# Patient Record
Sex: MALE | Race: WHITE | NOT HISPANIC OR LATINO | Employment: OTHER | URBAN - NONMETROPOLITAN AREA
[De-identification: names, ages, dates, MRNs, and addresses within clinical notes are randomized per-mention and may not be internally consistent; named-entity substitution may affect disease eponyms.]

---

## 2025-07-26 ENCOUNTER — OFFICE VISIT (OUTPATIENT)
Dept: URGENT CARE | Age: 45
End: 2025-07-26
Payer: MEDICARE

## 2025-07-26 ENCOUNTER — APPOINTMENT (OUTPATIENT)
Dept: URGENT CARE | Age: 45
End: 2025-07-26
Payer: MEDICARE

## 2025-07-26 ENCOUNTER — ANCILLARY PROCEDURE (OUTPATIENT)
Dept: URGENT CARE | Age: 45
End: 2025-07-26
Payer: MEDICARE

## 2025-07-26 VITALS
DIASTOLIC BLOOD PRESSURE: 72 MMHG | RESPIRATION RATE: 17 BRPM | OXYGEN SATURATION: 97 % | WEIGHT: 172 LBS | HEART RATE: 94 BPM | SYSTOLIC BLOOD PRESSURE: 118 MMHG | TEMPERATURE: 98.5 F

## 2025-07-26 DIAGNOSIS — R52 PAIN: ICD-10-CM

## 2025-07-26 DIAGNOSIS — S60.211A CONTUSION OF RIGHT WRIST, INITIAL ENCOUNTER: Primary | ICD-10-CM

## 2025-07-26 DIAGNOSIS — S63.501A SPRAIN OF RIGHT WRIST, INITIAL ENCOUNTER: ICD-10-CM

## 2025-07-26 PROCEDURE — 99203 OFFICE O/P NEW LOW 30 MIN: CPT

## 2025-07-26 PROCEDURE — 73110 X-RAY EXAM OF WRIST: CPT | Mod: RIGHT SIDE

## 2025-07-26 RX ORDER — OMEPRAZOLE 40 MG/1
40 CAPSULE, DELAYED RELEASE ORAL
COMMUNITY
Start: 2025-05-05

## 2025-07-26 RX ORDER — FLUOXETINE 20 MG/1
60 CAPSULE ORAL DAILY
COMMUNITY

## 2025-07-26 RX ORDER — LISINOPRIL AND HYDROCHLOROTHIAZIDE 12.5; 2 MG/1; MG/1
1 TABLET ORAL
COMMUNITY
Start: 2025-07-03

## 2025-07-26 RX ORDER — METFORMIN HYDROCHLORIDE 1000 MG/1
1000 TABLET ORAL
COMMUNITY
Start: 2025-07-03

## 2025-07-26 RX ORDER — ROSUVASTATIN CALCIUM 10 MG/1
10 TABLET, COATED ORAL NIGHTLY
COMMUNITY
Start: 2025-06-03

## 2025-07-26 RX ORDER — GABAPENTIN 300 MG/1
600 CAPSULE ORAL 3 TIMES DAILY
COMMUNITY
Start: 2025-06-13

## 2025-07-26 RX ORDER — BUPRENORPHINE AND NALOXONE 4; 1 MG/1; MG/1
FILM, SOLUBLE BUCCAL; SUBLINGUAL
COMMUNITY

## 2025-07-26 RX ORDER — DICYCLOMINE HYDROCHLORIDE 10 MG/1
CAPSULE ORAL
COMMUNITY
Start: 2025-07-08

## 2025-07-26 RX ORDER — BLOOD SUGAR DIAGNOSTIC
STRIP MISCELLANEOUS
COMMUNITY
Start: 2025-06-13

## 2025-07-26 RX ORDER — BUDESONIDE AND FORMOTEROL FUMARATE DIHYDRATE 80; 4.5 UG/1; UG/1
AEROSOL RESPIRATORY (INHALATION) EVERY 12 HOURS
COMMUNITY
Start: 2013-12-05

## 2025-07-26 RX ORDER — EMPAGLIFLOZIN 25 MG/1
25 TABLET, FILM COATED ORAL
COMMUNITY

## 2025-07-26 RX ORDER — CELECOXIB 200 MG/1
200 CAPSULE ORAL 2 TIMES DAILY
COMMUNITY
Start: 2025-07-17

## 2025-07-26 ASSESSMENT — ENCOUNTER SYMPTOMS
CHILLS: 0
WEAKNESS: 0
WOUND: 0
FATIGUE: 0
FEVER: 0
COUGH: 0
ARTHRALGIAS: 1
DIZZINESS: 0
BACK PAIN: 0
NUMBNESS: 0
JOINT SWELLING: 0
SHORTNESS OF BREATH: 0

## 2025-07-26 NOTE — PATIENT INSTRUCTIONS
Discharge instructions    Please follow up with orthopedics within the next 7 days.    If you develop any numbness weakness severe pain cold fingers absent pulses immediately go to the emergency room for evaluation.    Would recommend range of motion exercises rest ice compression elevation.    It is important to take prescriptions as prescribed and complete all antibiotics.     If your symptoms worsen you are instructed to immediately go to the emergency room for reevaluation and further assessment.    If you develop any chest pain, SOB, or difficulty breathing you are instructed to go to the emergency room for reevaluation.    All discharge instructions will be provided and explained to the patient at discharge.    If you have any questions regarding your treatment plan please call the [facility] urgent care clinic.

## 2025-07-26 NOTE — PROGRESS NOTES
Subjective   Patient ID: Alex Burnette is a 45 y.o. male. They present today with a chief complaint of Wrist Injury (1 hour ago, right wrist, was using impact  to change tire, impact  rotated taking his hand with it, says he heard a crunch isnt sure if it was his wrist or if it was the tire.).    History of Present Illness  Patient is a 45-year-old male presenting to the clinic with complaints of injury to right wrist.  Patient states he was using an impact drill trying to get wheels off of his mother's car.  He states that the impact drill spun to the left and spun his wrist with it.  Patient states he did hit the fender of the car and he has a small bruise over the dorsal aspect of the wrist.  No break in the skin no bleeding.  He denies any numbness or weakness.  He denies any pain at rest he states some pain to palpation of the dorsal aspect of the wrist.  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 He has never used smokeless tobacco. He reports that he does not currently use alcohol. He reports that he does not currently use drugs.    Review of Systems  Review of Systems   Constitutional:  Negative for chills, fatigue and fever.   Respiratory:  Negative for cough and shortness of breath.    Cardiovascular:  Negative for chest pain.   Musculoskeletal:  Positive for arthralgias. Negative for back pain and joint swelling.   Skin:  Negative for wound.   Neurological:  Negative for dizziness, weakness and numbness.   All other systems reviewed and are negative.                                 Objective    Vitals:    07/26/25 1546   BP: 118/72    BP Location: Left arm   Patient Position: Sitting   BP Cuff Size: Adult long   Pulse: 94   Resp: 17   SpO2: 97%   Weight: 78 kg (172 lb)     No LMP for male patient.    Physical Exam  Vitals reviewed.   Constitutional:       General: He is not in acute distress.     Appearance: Normal appearance. He is normal weight. He is not ill-appearing or toxic-appearing.   HENT:      Head: Normocephalic and atraumatic.      Mouth/Throat:      Mouth: Mucous membranes are moist.     Cardiovascular:      Rate and Rhythm: Normal rate and regular rhythm.      Pulses:           Radial pulses are 2+ on the right side.      Heart sounds: Normal heart sounds. No murmur heard.     No friction rub. No gallop.   Pulmonary:      Effort: Pulmonary effort is normal. No respiratory distress.      Breath sounds: Normal breath sounds. No stridor. No wheezing, rhonchi or rales.     Musculoskeletal:      Comments: No pain to palpation of the hand over the scaphoid.  Mild tenderness to palpation over the bruised area dorsal aspect of wrist.  No pain to palpation over the medial or lateral wrist no pain to palpation of the forearm no pain to palpation of the elbow.  Strength with  strength appears intact he does have normal flexion and extension range of motion and strength of the right wrist.     Skin:     General: Skin is warm.      Capillary Refill: Capillary refill takes less than 2 seconds.      Findings: Bruising present. No erythema or rash.      Comments: 1 small spot about a centimeter in diameter of bruising no open skin dorsal aspect of wrist.     Neurological:      General: No focal deficit present.      Mental Status: He is alert and oriented to person, place, and time. Mental status is at baseline.      Motor: No weakness.     Psychiatric:         Mood and Affect: Mood normal.         Procedures    Point of Care Test & Imaging Results from this visit  No results found for this visit on 07/26/25.   Imaging  No results  found.    Cardiology, Vascular, and Other Imaging  No other imaging results found for the past 2 days      Diagnostic study results (if any) were reviewed by [facility].    Assessment/Plan   Allergies, medications, history, and pertinent labs/EKGs/Imaging reviewed by Hossein Champagne PA-C.     Medical Decision Making:    Patient is a 45-year-old male presenting to the clinic with complaints of injury to right wrist.  Patient states he was using an impact drill trying to get wheels off of his mother's tire.  He states that the impact drill spun to the left and spun his wrist with it.  Patient states he did hit the fender of the car and he has a small bruise over the dorsal aspect of the wrist.  No break in the skin no bleeding.  He denies any numbness or weakness.  He denies any pain at rest he states some pain to palpation of the dorsal aspect of the wrist.  No elbow pain no hand pain no scaphoid pain. No pain to palpation of the hand over the scaphoid.  Mild tenderness to palpation over the bruised area dorsal aspect of wrist.  No pain to palpation over the medial or lateral wrist no pain to palpation of the forearm no pain to palpation of the elbow.  Strength with  strength appears intact he does have normal flexion and extension range of motion and strength of the right wrist.  Vital signs in the clinic are stable within normal limits.  Physical examination as above.  Patient states that he is really only here to see if his wrist is broken.  X-ray completed findings by radiology: No fracture or dislocation is evident.  No soft tissue gas or radiopaque foreign bodies evident.  Impression: No osseous injury is evident.  Likely contusion with sprain of right wrist.  I did offer bracing patient does not want any at this time.  No scaphoid tenderness.  Patient states he would prefer to use rest ice compression and elevation. Discharge instructions: Please follow up with orthopedics within the next 7 days.  If you develop any numbness weakness severe pain cold fingers absent pulses immediately go to the emergency room for evaluation. Would recommend range of motion exercises rest ice compression elevation. It is important to take prescriptions as prescribed and complete all antibiotics. If your symptoms worsen you are instructed to immediately go to the emergency room for reevaluation and further assessment. If you develop any chest pain, SOB, or difficulty breathing you are instructed to go to the emergency room for reevaluation. All discharge instructions will be provided and explained to the patient at discharge. If you have any questions regarding your treatment plan please call the [facility] urgent care clinic.     Orders and Diagnoses  Diagnoses and all orders for this visit:  Pain  -     XR wrist right 3+ views; Future      Medical Admin Record      Patient disposition: Home    Electronically signed by [facility]  3:49 PM           [1] (Not in a hospital admission)  [2] No past medical history on file.  [3] No past surgical history on file.

## 2025-07-27 ENCOUNTER — TELEPHONE (OUTPATIENT)
Dept: URGENT CARE | Age: 45
End: 2025-07-27

## 2025-07-27 NOTE — TELEPHONE ENCOUNTER
PT seen on 07/26/2025. Next day courtesy call. LVM with clinic number letting PT know they can call back with any questions or concerns.

## 2025-08-30 ENCOUNTER — HOSPITAL ENCOUNTER (EMERGENCY)
Facility: HOSPITAL | Age: 45
Discharge: HOME | End: 2025-08-30
Payer: MEDICARE

## 2025-08-30 ENCOUNTER — APPOINTMENT (OUTPATIENT)
Dept: RADIOLOGY | Facility: HOSPITAL | Age: 45
End: 2025-08-30
Payer: MEDICARE

## 2025-08-30 ASSESSMENT — PAIN DESCRIPTION - ORIENTATION: ORIENTATION: MID

## 2025-08-30 ASSESSMENT — PAIN DESCRIPTION - LOCATION: LOCATION: ABDOMEN

## 2025-08-30 ASSESSMENT — PAIN SCALES - GENERAL
PAINLEVEL_OUTOF10: 4
PAINLEVEL_OUTOF10: 2

## 2025-08-30 ASSESSMENT — PAIN DESCRIPTION - FREQUENCY: FREQUENCY: CONSTANT/CONTINUOUS

## 2025-08-30 ASSESSMENT — PAIN - FUNCTIONAL ASSESSMENT
PAIN_FUNCTIONAL_ASSESSMENT: 0-10
PAIN_FUNCTIONAL_ASSESSMENT: 0-10

## 2025-08-30 ASSESSMENT — PAIN DESCRIPTION - PAIN TYPE: TYPE: ACUTE PAIN

## 2025-08-30 ASSESSMENT — PAIN DESCRIPTION - DESCRIPTORS: DESCRIPTORS: ACHING

## 2025-08-30 ASSESSMENT — PAIN DESCRIPTION - PROGRESSION: CLINICAL_PROGRESSION: GRADUALLY IMPROVING

## 2025-08-30 ASSESSMENT — PAIN DESCRIPTION - ONSET: ONSET: ONGOING

## 2025-09-04 ENCOUNTER — HOSPITAL ENCOUNTER (OUTPATIENT)
Dept: CARDIOLOGY | Facility: HOSPITAL | Age: 45
Discharge: HOME | End: 2025-09-04
Payer: MEDICARE

## 2025-09-04 PROCEDURE — 93005 ELECTROCARDIOGRAM TRACING: CPT
